# Patient Record
Sex: FEMALE | URBAN - METROPOLITAN AREA
[De-identification: names, ages, dates, MRNs, and addresses within clinical notes are randomized per-mention and may not be internally consistent; named-entity substitution may affect disease eponyms.]

---
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
## 2024-10-13 ENCOUNTER — APPOINTMENT (OUTPATIENT)
Dept: GENERAL RADIOLOGY | Age: 24
End: 2024-10-13
Payer: OTHER MISCELLANEOUS

## 2024-10-13 ENCOUNTER — HOSPITAL ENCOUNTER (EMERGENCY)
Age: 24
Discharge: HOME | End: 2024-10-13
Attending: EMERGENCY MEDICINE
Payer: OTHER MISCELLANEOUS

## 2024-10-13 VITALS
HEIGHT: 66 IN | RESPIRATION RATE: 16 BRPM | DIASTOLIC BLOOD PRESSURE: 89 MMHG | BODY MASS INDEX: 30.97 KG/M2 | SYSTOLIC BLOOD PRESSURE: 146 MMHG | WEIGHT: 192.7 LBS | HEART RATE: 76 BPM | TEMPERATURE: 98 F | OXYGEN SATURATION: 100 %

## 2024-10-13 DIAGNOSIS — R03.0 ELEVATED BLOOD PRESSURE READING: ICD-10-CM
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
## 2024-10-13 DIAGNOSIS — S80.11XA CONTUSION OF MULTIPLE SITES OF RIGHT LOWER EXTREMITY, INITIAL ENCOUNTER: ICD-10-CM

## 2024-10-13 PROCEDURE — 73562 X-RAY EXAM OF KNEE 3: CPT

## 2024-10-13 PROCEDURE — 73590 X-RAY EXAM OF LOWER LEG: CPT

## 2024-10-13 PROCEDURE — 73610 X-RAY EXAM OF ANKLE: CPT

## 2024-10-13 PROCEDURE — 99283 EMERGENCY DEPT VISIT LOW MDM: CPT

## 2024-10-13 PROCEDURE — 6370000000 HC RX 637 (ALT 250 FOR IP): Performed by: EMERGENCY MEDICINE

## 2024-10-13 RX ORDER — ACETAMINOPHEN 500 MG
1000 TABLET ORAL ONCE
Status: COMPLETED | OUTPATIENT
Start: 2024-10-13 | End: 2024-10-13

## 2024-10-13 RX ORDER — NAPROXEN 250 MG/1
500 TABLET ORAL ONCE
Status: COMPLETED | OUTPATIENT
Start: 2024-10-13 | End: 2024-10-13

## 2024-10-13 RX ORDER — ACETAMINOPHEN 500 MG
500 TABLET ORAL EVERY 6 HOURS PRN
Qty: 30 TABLET | Refills: 0 | Status: SHIPPED | OUTPATIENT
Start: 2024-10-13

## 2024-10-13 RX ORDER — IBUPROFEN 600 MG/1
600 TABLET, FILM COATED ORAL 3 TIMES DAILY PRN
Qty: 30 TABLET | Refills: 0 | Status: SHIPPED | OUTPATIENT
Start: 2024-10-13

## 2024-10-13 RX ADMIN — NAPROXEN 500 MG: 250 TABLET ORAL at 11:16

## 2024-10-13 RX ADMIN — ACETAMINOPHEN 1000 MG: 500 TABLET ORAL at 11:16

## 2024-10-13 ASSESSMENT — PAIN SCALES - GENERAL
PAINLEVEL_OUTOF10: 10
PAINLEVEL_OUTOF10: 10

## 2024-10-13 ASSESSMENT — PAIN DESCRIPTION - DESCRIPTORS: DESCRIPTORS: SHARP

## 2024-10-13 ASSESSMENT — PAIN DESCRIPTION - LOCATION: LOCATION: ANKLE

## 2024-10-13 ASSESSMENT — PAIN DESCRIPTION - PAIN TYPE: TYPE: ACUTE PAIN

## 2024-10-13 ASSESSMENT — PAIN - FUNCTIONAL ASSESSMENT
PAIN_FUNCTIONAL_ASSESSMENT: ACTIVITIES ARE NOT PREVENTED
PAIN_FUNCTIONAL_ASSESSMENT: 0-10

## 2024-10-13 ASSESSMENT — PAIN DESCRIPTION - FREQUENCY: FREQUENCY: CONTINUOUS

## 2024-10-13 ASSESSMENT — PAIN DESCRIPTION - ORIENTATION: ORIENTATION: RIGHT

## 2024-10-13 ASSESSMENT — PAIN DESCRIPTION - ONSET: ONSET: ON-GOING

## 2024-10-13 NOTE — ED PROVIDER NOTES
MEDICATIONS:   Discharge Medication List as of 10/13/2024 11:30 AM               (Please note that portions of this note were completed with a voice recognition program.  Efforts were made to edit the dictations but occasionally words are mis-transcribed.)     Jeff Chavez MD (electronically signed)         Jeff Chavez MD  10/13/24 0111

## 2024-10-25 ENCOUNTER — HOSPITAL ENCOUNTER (EMERGENCY)
Age: 24
Discharge: HOME | End: 2024-10-25
Attending: EMERGENCY MEDICINE
Payer: COMMERCIAL

## 2024-10-25 VITALS
TEMPERATURE: 99.1 F | WEIGHT: 194.8 LBS | SYSTOLIC BLOOD PRESSURE: 157 MMHG | HEART RATE: 95 BPM | RESPIRATION RATE: 17 BRPM | BODY MASS INDEX: 31.31 KG/M2 | OXYGEN SATURATION: 100 % | HEIGHT: 66 IN | DIASTOLIC BLOOD PRESSURE: 91 MMHG

## 2024-10-25 DIAGNOSIS — J06.9 VIRAL URI WITH COUGH: Primary | ICD-10-CM

## 2024-10-25 LAB
FLUAV RNA RESP QL NAA+PROBE: NOT DETECTED
FLUBV RNA RESP QL NAA+PROBE: NOT DETECTED
S PYO AG THROAT QL: NEGATIVE
SARS-COV-2 RNA RESP QL NAA+PROBE: NOT DETECTED

## 2024-10-25 PROCEDURE — 87636 SARSCOV2 & INF A&B AMP PRB: CPT

## 2024-10-25 PROCEDURE — 99283 EMERGENCY DEPT VISIT LOW MDM: CPT

## 2024-10-25 PROCEDURE — 87880 STREP A ASSAY W/OPTIC: CPT

## 2024-10-25 PROCEDURE — 6370000000 HC RX 637 (ALT 250 FOR IP)

## 2024-10-25 RX ORDER — IBUPROFEN 400 MG/1
800 TABLET, FILM COATED ORAL ONCE
Status: COMPLETED | OUTPATIENT
Start: 2024-10-25 | End: 2024-10-25

## 2024-10-25 RX ADMIN — IBUPROFEN 800 MG: 400 TABLET, FILM COATED ORAL at 09:29

## 2024-10-25 ASSESSMENT — LIFESTYLE VARIABLES
HOW MANY STANDARD DRINKS CONTAINING ALCOHOL DO YOU HAVE ON A TYPICAL DAY: PATIENT DOES NOT DRINK
HOW OFTEN DO YOU HAVE A DRINK CONTAINING ALCOHOL: NEVER

## 2024-10-25 ASSESSMENT — PAIN DESCRIPTION - LOCATION: LOCATION: EAR;HEAD

## 2024-10-25 ASSESSMENT — PAIN - FUNCTIONAL ASSESSMENT: PAIN_FUNCTIONAL_ASSESSMENT: 0-10

## 2024-10-25 ASSESSMENT — PAIN DESCRIPTION - DESCRIPTORS: DESCRIPTORS: PRESSURE

## 2024-10-25 ASSESSMENT — PAIN SCALES - GENERAL: PAINLEVEL_OUTOF10: 10

## 2024-10-25 NOTE — ED TRIAGE NOTES
THE [facility]  EMERGENCY DEPARTMENT ENCOUNTER          PHYSICIAN ASSISTANT NOTE       Date of evaluation: 10/25/2024    Chief Complaint     Cold Symptoms (Patient presents to the ED with c/o cold, flu-like sx. Headache, chills, runny nose, sore throat, ear ache that began yesterday but worsened overnight.)      History of Present Illness     Darshan Zuniga is a 24 y.o. female who presents with a 1 day history of sore throat, dry cough, ear pain, and headaches. She denies fevers or chills. She is toleraitng PO fluid intake. No neck pain, nausea, or vomiting.     ASSESSMENT / PLAN  (MEDICAL DECISION MAKING)     INITIAL VITALS: BP: (!) 157/91, Temp: 99.1 °F (37.3 °C), Pulse: 95, Respirations: 17, SpO2: 100 %    Darshan Zuniga is a 24 y.o. female with 1 day history of sore throat, non productive cough, headache, and ear pain. No fevers or chills at home. She is tolerating PO fluid intake. No neck pain, nausea, or vomiting.  On initial, impression patient is sitting on the exam table comfortably in no acute distress. Vitals are stable, she is afebrile. On exam, the pharynx appears to be clear with no signs of tonsillitis. There  appears to be mild fluid behind bilateral ear drums but no signs of otitis media. No mastoid tenderness. No abdominal tenderness to auscultation.   Workup in the ED with negative strep test. Covid and influenza swabs are currently pending.   Patient symptoms are most likely from a viral URI. She will be discharged home with instructions to drink fluids, and to alternate ibuprofen/tylenol for the pain. She was advised that COVID/Influenza will be available on [facility].       Is this patient to be included in the SEP-1 core measure? No Exclusion criteria - the patient is NOT to be included for SEP-1 Core Measure due to: Viral etiology found or highly suspected (including COVID-19) without concomitant bacterial infection    Medical Decision Making  Amount and/or Complexity of Data

## 2024-10-25 NOTE — ED PROVIDER NOTES
ED Attending Attestation Note     Date of evaluation: 10/25/2024    This patient was seen by the advance practice provider.  I have seen and examined the patient, agree with the workup, evaluation, management and diagnosis. The care plan has been discussed.  My assessment reveals a young female who presents with sore throat and generalized malaise.  States that has been going on for a few days.  She came in because she had difficulty sleeping last night due to the pain.  Oropharynx without any exudates.  Lungs grossly clear.     Claudia Espinal MD  10/25/24 1021

## 2024-10-25 NOTE — DISCHARGE INSTRUCTIONS
As discussed, your symptoms are most likely caused by a virus. Please make sure to drink fluids, use vaporizer, acetaminophen, and other OTC medication for symptom control.     done

## 2024-11-01 NOTE — ED NOTES
THE [facility]  EMERGENCY DEPARTMENT ENCOUNTER          PHYSICIAN ASSISTANT NOTE       Date of evaluation: 10/25/2024    Chief Complaint     Cold Symptoms (Patient presents to the ED with c/o cold, flu-like sx. Headache, chills, runny nose, sore throat, ear ache that began yesterday but worsened overnight.)      History of Present Illness     Darshan Zuniga is a 24 y.o. female who presents  with a 1 day history of sore throat, dry cough, ear pain, and headaches. She denies fevers or chills. She is toleraitng PO fluid intake. No neck pain, nausea, or vomiting.     ASSESSMENT / PLAN  (MEDICAL DECISION MAKING)     INITIAL VITALS: BP: (!) 157/91, Temp: 99.1 °F (37.3 °C), Pulse: 95, Respirations: 17, SpO2: 100 %    Darshan Zuniga is a 24 y.o. female with 1 day history of sore throat, non productive cough, headache, and ear pain. No fevers or chills at home. She is tolerating PO fluid intake. No neck pain, nausea, or vomiting.  On initial, impression patient is sitting on the exam table comfortably in no acute distress. Vitals are stable, she is afebrile. On exam, the pharynx appears to be clear with no signs of tonsillitis. There  appears to be mild fluid behind bilateral ear drums but no signs of otitis media. No mastoid tenderness. No abdominal tenderness to auscultation.   Workup in the ED with negative strep test. Covid and influenza swabs are currently pending.   Patient symptoms are most likely from a viral URI. She will be discharged home with instructions to drink fluids, and to alternate ibuprofen/tylenol for the pain. She was advised that COVID/Influenza will be available on mychart.     Is this patient to be included in the SEP-1 core measure? No Exclusion criteria - the patient is NOT to be included for SEP-1 Core Measure due to: 2+ SIRS criteria are not met    Medical Decision Making  Amount and/or Complexity of Data Reviewed  Labs: ordered. Decision-making details documented in ED  reports that she does not currently use alcohol. She reports that she does not currently use drugs.    Medications     Discharge Medication List as of 10/25/2024 10:34 AM        CONTINUE these medications which have NOT CHANGED    Details   acetaminophen (TYLENOL) 500 MG tablet Take 1 tablet by mouth every 6 hours as needed for Pain, Disp-30 tablet, R-0Normal      ibuprofen (ADVIL;MOTRIN) 600 MG tablet Take 1 tablet by mouth 3 times daily as needed for Pain, Disp-30 tablet, R-0Normal      Norethin Ace-Eth Estrad-FE 1-20 MG-MCG(24) TABS Take 1 tablet by mouth dailyHistorical Med             Allergies     She has No Known Allergies.    Physical Exam     INITIAL VITALS: BP: (!) 157/91, Temp: 99.1 °F (37.3 °C), Pulse: 95, Respirations: 17, SpO2: 100 %  Physical Exam  Vitals and nursing note reviewed.   Constitutional:       Appearance: Normal appearance.   HENT:      Right Ear: Tympanic membrane normal.      Left Ear: Tympanic membrane normal.      Nose: Nose normal.      Mouth/Throat:      Mouth: Mucous membranes are moist.      Pharynx: No oropharyngeal exudate or posterior oropharyngeal erythema.   Cardiovascular:      Rate and Rhythm: Normal rate and regular rhythm.   Pulmonary:      Effort: Pulmonary effort is normal.      Breath sounds: Normal breath sounds.   Abdominal:      Palpations: Abdomen is soft.      Tenderness: There is no abdominal tenderness.   Musculoskeletal:         General: Normal range of motion.      Cervical back: Normal range of motion.   Skin:     General: Skin is warm.      Capillary Refill: Capillary refill takes less than 2 seconds.   Neurological:      General: No focal deficit present.      Mental Status: She is alert.          Debbie Alexander PA-C  10/31/24 2120

## 2024-11-04 NOTE — ED PROVIDER NOTES
THE [facility]  EMERGENCY DEPARTMENT ENCOUNTER          PHYSICIAN ASSISTANT NOTE       Date of evaluation: 10/25/2024    Chief Complaint     Cold Symptoms (Patient presents to the ED with c/o cold, flu-like sx. Headache, chills, runny nose, sore throat, ear ache that began yesterday but worsened overnight.)      History of Present Illness     Darshan Zuniga is a 24 y.o. female who presents with a 1 day history of sore throat, non productive cough, headache, and ear pain. No fevers or chills at home. She is tolerating PO fluid intake. No neck pain, nausea, or vomiting.    ASSESSMENT / PLAN  (MEDICAL DECISION MAKING)     INITIAL VITALS: BP: (!) 157/91, Temp: 99.1 °F (37.3 °C), Pulse: 95, Respirations: 17, SpO2: 100 %    Darshan Zuniga is a 24 y.o. female who presents with a 1 day history of sore throat, non productive cough, headache, and ear pain. No fevers or chills at home. She is tolerating PO fluid intake. No neck pain, nausea, or vomiting.    On initial, impression patient is sitting on the exam table comfortably in no acute distress. Vitals are stable, she is afebrile. On exam, the pharynx appears to be clear with no signs of tonsillitis. There  appears to be mild fluid behind bilateral ear drums but no signs of otitis media. No mastoid tenderness. No abdominal tenderness to auscultation.   Workup in the ED with negative strep test. Covid and influenza swabs are currently pending.   Patient symptoms are most likely from a viral URI. She will be discharged home with instructions to drink fluids, and to alternate ibuprofen/tylenol for the pain. She was advised that COVID/Influenza will be available on mychart.     Is this patient to be included in the SEP-1 core measure? No Exclusion criteria - the patient is NOT to be included for SEP-1 Core Measure due to: 2+ SIRS criteria are not met    Medical Decision Making  Amount and/or Complexity of Data Reviewed  Labs: ordered. Decision-making details documented

## 2025-07-28 ENCOUNTER — APPOINTMENT (OUTPATIENT)
Dept: GENERAL RADIOLOGY | Age: 25
End: 2025-07-28

## 2025-07-28 ENCOUNTER — HOSPITAL ENCOUNTER (EMERGENCY)
Age: 25
Discharge: HOME | End: 2025-07-28

## 2025-07-28 VITALS
OXYGEN SATURATION: 100 % | TEMPERATURE: 97.8 F | DIASTOLIC BLOOD PRESSURE: 98 MMHG | RESPIRATION RATE: 12 BRPM | HEART RATE: 69 BPM | SYSTOLIC BLOOD PRESSURE: 175 MMHG

## 2025-07-28 DIAGNOSIS — M77.8 TENDINITIS OF FINGER OF LEFT HAND: Primary | ICD-10-CM

## 2025-07-28 PROCEDURE — 99283 EMERGENCY DEPT VISIT LOW MDM: CPT

## 2025-07-28 PROCEDURE — 73140 X-RAY EXAM OF FINGER(S): CPT

## 2025-07-28 RX ORDER — MELOXICAM 7.5 MG/1
7.5 TABLET ORAL DAILY PRN
Qty: 10 TABLET | Refills: 0 | Status: SHIPPED | OUTPATIENT
Start: 2025-07-28 | End: 2025-08-07

## 2025-07-28 ASSESSMENT — PAIN DESCRIPTION - DESCRIPTORS: DESCRIPTORS: ACHING;SHARP

## 2025-07-28 ASSESSMENT — PAIN DESCRIPTION - ORIENTATION: ORIENTATION: LEFT

## 2025-07-28 ASSESSMENT — PAIN - FUNCTIONAL ASSESSMENT
PAIN_FUNCTIONAL_ASSESSMENT: ACTIVITIES ARE NOT PREVENTED
PAIN_FUNCTIONAL_ASSESSMENT: 0-10

## 2025-07-28 ASSESSMENT — PAIN DESCRIPTION - ONSET: ONSET: ON-GOING

## 2025-07-28 ASSESSMENT — PAIN DESCRIPTION - FREQUENCY: FREQUENCY: CONTINUOUS

## 2025-07-28 ASSESSMENT — PAIN SCALES - GENERAL: PAINLEVEL_OUTOF10: 10

## 2025-07-28 ASSESSMENT — PAIN DESCRIPTION - LOCATION: LOCATION: FINGER (COMMENT WHICH ONE)

## 2025-07-28 ASSESSMENT — PAIN DESCRIPTION - PAIN TYPE: TYPE: ACUTE PAIN

## 2025-07-28 NOTE — ED PROVIDER NOTES
[facility]  eMERGENCY dEPARTMENT eNCOUnter    Pt Name: @@  MRN: [id number]  Birthdate2000  Date of evaluation: 7/28/2025  Provider: WESTON Coronado CNP        CHIEF COMPLAINT       Chief Complaint   Patient presents with    Finger Pain     Left index no injury          HISTORY OF PRESENT ILLNESS  (Location/Symptom, Timing/Onset, Context/Setting, Quality, Duration, Modifying Factors, Severity.)   Darshan Zuniga is a 24 y.o. female who presents to the emergencydepartment PMHx: None    Lives at home  CODE STATUS full  Anticoagulation therapy: None  Social determinants: No housing or food disparity, does have a PCP, is employed, no assistive devices    HPI provided by the patient    Patient presents complaining of nontraumatic left index finger pain starting at the MCP and extending throughout the finger.  Pain is on the dorsal and volar surfaces.  Denies trauma.  She is right-hand dominant.    Has been taking ibuprofen for pain.  Has no other joint pain tenderness or swelling.  Denies any known medical history.      Nursing Notes were reviewed and I agree.    REVIEW OF SYSTEMS    (2-9 systems for level 4, 10 or more for level 5)     Review of Systems   Musculoskeletal:         As stated in HPI       Except as noted above the remainder of the review of systems was reviewed and negative.       PAST MEDICAL HISTORY   History reviewed. No pertinent past medical history.    SURGICAL HISTORY     History reviewed. No pertinent surgical history.    CURRENT MEDICATIONS       Discharge Medication List as of 7/28/2025  4:53 PM        CONTINUE these medications which have NOT CHANGED    Details   acetaminophen (TYLENOL) 500 MG tablet Take 1 tablet by mouth every 6 hours as needed for Pain, Disp-30 tablet, R-0Normal      Norethin Ace-Eth Estrad-FE 1-20 MG-MCG(24) TABS Take 1 tablet by mouth dailyHistorical Med             ALLERGIES     Patient has no known allergies.    FAMILY HISTORY